# Patient Record
Sex: FEMALE | Race: WHITE | NOT HISPANIC OR LATINO | URBAN - METROPOLITAN AREA
[De-identification: names, ages, dates, MRNs, and addresses within clinical notes are randomized per-mention and may not be internally consistent; named-entity substitution may affect disease eponyms.]

---

## 2021-02-10 ENCOUNTER — INPATIENT (INPATIENT)
Facility: HOSPITAL | Age: 51
LOS: 2 days | Discharge: HOME | End: 2021-02-13
Attending: HOSPITALIST | Admitting: HOSPITALIST
Payer: COMMERCIAL

## 2021-02-10 VITALS
OXYGEN SATURATION: 100 % | SYSTOLIC BLOOD PRESSURE: 164 MMHG | HEART RATE: 113 BPM | HEIGHT: 62 IN | RESPIRATION RATE: 22 BRPM | WEIGHT: 171.08 LBS | TEMPERATURE: 97 F | DIASTOLIC BLOOD PRESSURE: 75 MMHG

## 2021-02-10 LAB
ALBUMIN SERPL ELPH-MCNC: 3.5 G/DL — SIGNIFICANT CHANGE UP (ref 3.5–5.2)
ALP SERPL-CCNC: 88 U/L — SIGNIFICANT CHANGE UP (ref 30–115)
ALT FLD-CCNC: 11 U/L — SIGNIFICANT CHANGE UP (ref 0–41)
ANION GAP SERPL CALC-SCNC: 11 MMOL/L — SIGNIFICANT CHANGE UP (ref 7–14)
ANISOCYTOSIS BLD QL: SIGNIFICANT CHANGE UP
APTT BLD: 32 SEC — SIGNIFICANT CHANGE UP (ref 27–39.2)
AST SERPL-CCNC: 19 U/L — SIGNIFICANT CHANGE UP (ref 0–41)
BILIRUB SERPL-MCNC: 1.8 MG/DL — HIGH (ref 0.2–1.2)
BLD GP AB SCN SERPL QL: SIGNIFICANT CHANGE UP
BUN SERPL-MCNC: 5 MG/DL — LOW (ref 10–20)
CALCIUM SERPL-MCNC: 8.5 MG/DL — SIGNIFICANT CHANGE UP (ref 8.5–10.1)
CHLORIDE SERPL-SCNC: 104 MMOL/L — SIGNIFICANT CHANGE UP (ref 98–110)
CO2 SERPL-SCNC: 21 MMOL/L — SIGNIFICANT CHANGE UP (ref 17–32)
CREAT SERPL-MCNC: 0.5 MG/DL — LOW (ref 0.7–1.5)
DACRYOCYTES BLD QL SMEAR: SLIGHT — SIGNIFICANT CHANGE UP
ELLIPTOCYTES BLD QL SMEAR: SIGNIFICANT CHANGE UP
GLUCOSE SERPL-MCNC: 144 MG/DL — HIGH (ref 70–99)
HCT VFR BLD CALC: 11.4 % — LOW (ref 37–47)
HGB BLD-MCNC: 2.7 G/DL — CRITICAL LOW (ref 12–16)
HYPOCHROMIA BLD QL: SIGNIFICANT CHANGE UP
INR BLD: 1.75 RATIO — HIGH (ref 0.65–1.3)
LDH SERPL L TO P-CCNC: 256 — HIGH (ref 50–242)
MACROCYTES BLD QL: SIGNIFICANT CHANGE UP
MANUAL SMEAR VERIFICATION: SIGNIFICANT CHANGE UP
MCHC RBC-ENTMCNC: 13.9 PG — LOW (ref 27–31)
MCHC RBC-ENTMCNC: 23.7 G/DL — LOW (ref 32–37)
MCV RBC AUTO: 58.8 FL — LOW (ref 81–99)
MICROCYTES BLD QL: SIGNIFICANT CHANGE UP
NEUTS HYPERSEG # BLD: SIGNIFICANT CHANGE UP
NRBC # BLD: 0 /100 — SIGNIFICANT CHANGE UP (ref 0–0)
NRBC # BLD: SIGNIFICANT CHANGE UP /100 WBCS (ref 0–0)
NT-PROBNP SERPL-SCNC: 1013 PG/ML — HIGH (ref 0–300)
OVALOCYTES BLD QL SMEAR: SLIGHT — SIGNIFICANT CHANGE UP
PLAT MORPH BLD: NORMAL — SIGNIFICANT CHANGE UP
PLATELET # BLD AUTO: 238 K/UL — SIGNIFICANT CHANGE UP (ref 130–400)
POIKILOCYTOSIS BLD QL AUTO: SIGNIFICANT CHANGE UP
POTASSIUM SERPL-MCNC: 3.9 MMOL/L — SIGNIFICANT CHANGE UP (ref 3.5–5)
POTASSIUM SERPL-SCNC: 3.9 MMOL/L — SIGNIFICANT CHANGE UP (ref 3.5–5)
PROT SERPL-MCNC: 6.3 G/DL — SIGNIFICANT CHANGE UP (ref 6–8)
PROTHROM AB SERPL-ACNC: 20.1 SEC — HIGH (ref 9.95–12.87)
RAPID RVP RESULT: SIGNIFICANT CHANGE UP
RBC # BLD: 1.94 M/UL — LOW (ref 4.2–5.4)
RBC # FLD: 23.7 % — HIGH (ref 11.5–14.5)
RBC BLD AUTO: ABNORMAL
SARS-COV-2 RNA SPEC QL NAA+PROBE: SIGNIFICANT CHANGE UP
SMUDGE CELLS # BLD: SIGNIFICANT CHANGE UP
SODIUM SERPL-SCNC: 136 MMOL/L — SIGNIFICANT CHANGE UP (ref 135–146)
TROPONIN T SERPL-MCNC: <0.01 NG/ML — SIGNIFICANT CHANGE UP
WBC # BLD: 5.8 K/UL — SIGNIFICANT CHANGE UP (ref 4.8–10.8)
WBC # FLD AUTO: 5.8 K/UL — SIGNIFICANT CHANGE UP (ref 4.8–10.8)

## 2021-02-10 PROCEDURE — 74177 CT ABD & PELVIS W/CONTRAST: CPT | Mod: 26

## 2021-02-10 PROCEDURE — 93970 EXTREMITY STUDY: CPT | Mod: 26

## 2021-02-10 PROCEDURE — 71045 X-RAY EXAM CHEST 1 VIEW: CPT | Mod: 26

## 2021-02-10 PROCEDURE — 71260 CT THORAX DX C+: CPT | Mod: 26

## 2021-02-10 PROCEDURE — 99285 EMERGENCY DEPT VISIT HI MDM: CPT

## 2021-02-10 PROCEDURE — 99223 1ST HOSP IP/OBS HIGH 75: CPT

## 2021-02-10 RX ORDER — FUROSEMIDE 40 MG
20 TABLET ORAL ONCE
Refills: 0 | Status: COMPLETED | OUTPATIENT
Start: 2021-02-10 | End: 2021-02-10

## 2021-02-10 RX ORDER — CEFTRIAXONE 500 MG/1
1000 INJECTION, POWDER, FOR SOLUTION INTRAMUSCULAR; INTRAVENOUS ONCE
Refills: 0 | Status: COMPLETED | OUTPATIENT
Start: 2021-02-10 | End: 2021-02-10

## 2021-02-10 RX ORDER — AZITHROMYCIN 500 MG/1
500 TABLET, FILM COATED ORAL ONCE
Refills: 0 | Status: COMPLETED | OUTPATIENT
Start: 2021-02-10 | End: 2021-02-10

## 2021-02-10 RX ADMIN — Medication 20 MILLIGRAM(S): at 17:31

## 2021-02-10 RX ADMIN — AZITHROMYCIN 255 MILLIGRAM(S): 500 TABLET, FILM COATED ORAL at 13:12

## 2021-02-10 RX ADMIN — Medication 20 MILLIGRAM(S): at 19:45

## 2021-02-10 RX ADMIN — CEFTRIAXONE 100 MILLIGRAM(S): 500 INJECTION, POWDER, FOR SOLUTION INTRAMUSCULAR; INTRAVENOUS at 13:28

## 2021-02-10 NOTE — ED ADULT TRIAGE NOTE - CHIEF COMPLAINT QUOTE
BIBA via [facility] from doctors office, pt states since today she has been experiencing shortness of breath and difficulty breathing associated with cough, Denies Chest pain

## 2021-02-10 NOTE — H&P ADULT - NSHPSOCIALHISTORY_GEN_ALL_CORE
Marital Status:  ( x  )    (   ) Single    (   )    (  )   Lives with: (  ) alone  (  ) children   ( x ) spouse   (  ) parents  (  ) other  Recent Travel: No recent travel  Occupation:  at [employer]    Substance Use (street drugs): ( x ) never used  (  ) other:  Tobacco Usage:  ( x  ) never smoked   (   ) former smoker   (   ) current smoker  (     ) pack year  Alcohol Usage: None

## 2021-02-10 NOTE — CONSULT NOTE ADULT - ASSESSMENT
IMPRESSION:  SOb secondary to symptomatic anemia    CHF   ?? underlying ovarian cancer or fibroid   underlying malignancy       PLAN:    CNS: no sedation     HEENT: oral care     PULMONARY:keep pox > 92%     CARDIOVASCULAR: s/p 20 mg lasix   give another 20 mg between tranfusion   echo   cardiology eval   ekg   trop negative   elevated BNP     GI: GI prophylaxis.  Feeding     RENAL: follow Is and OS follow lytes     INFECTIOUS DISEASE: no need for abx   check procalcitonin   pan cx     HEMATOLOGICAL:  DVT prophylaxis. transfuse 2-3 unit prbc   lasix in between   hemolytic work up , blood smear   hematology consult   OBGYn consult patient denies heavy menstrual cycle     ENDOCRINE:  Follow up FS.  Insulin protocol if needed.        CRITICAL CARE TIME SPENT: ***

## 2021-02-10 NOTE — H&P ADULT - NSHPLABSRESULTS_GEN_ALL_CORE
CT Chest w/ IV Cont (02.10.21)  1.  Findings compatible with pulmonary edema.  2.  Small to moderate bilateral pleural effusions with adjacent atelectasis.  3.  Cardiomegaly.  4.   Passive congestion of the liver.  5.  Heterogeneous and enlarged uterus, likely related to underlying degenerating fibroids.  6.  Left adnexal cyst measuring up to 3.2 cm. Pelvic ultrasound in 6-8 weeks is recommended for follow-up.      VA Duplex Lower Ext Vein Scan, Bilat (02.10.21):  No evidence of deep venous thrombosis or superficial thrombophlebitis in the bilateral lower extremities.

## 2021-02-10 NOTE — ED PROVIDER NOTE - PHYSICAL EXAMINATION
--EXAM--  VITAL SIGNS: I have reviewed vs documented at present.  CONSTITUTIONAL: Well-developed; well-nourished; in no acute distress.   SKIN: Warm and dry, no acute rash. positive pallor   HEAD: Normocephalic; atraumatic.  EYES: PERRL, EOM intact; conjunctiva and sclera clear. No nystagmus.  ENT: No nasal discharge; airway clear.  NECK: Supple; non tender.  CARD: S1, S2, Regular rate and rhythm.   RESP: positive diffuse ronchi   ABD: Normal bowel sounds; soft; non-distended; non-tender.  EXT: Normal ROM.   NEURO: Alert, oriented, grossly unremarkable. Strength 5/5 in all extremities. Sensation intact throughout.  PSYCH: Cooperative, appropriate.

## 2021-02-10 NOTE — ED PROVIDER NOTE - NS ED ROS FT
Review of Systems:  	•	CONSTITUTIONAL - no fever, no diaphoresis, no chills  	•	SKIN - no rash  	•	HEMATOLOGIC - no bleeding, no bruising  	•	EYES - no eye pain, no blurry vision  	•	ENT - no change in hearing, no sore throat, no ear pain or tinnitus  	•	RESPIRATORY -  shortness of breath, and  cough  	•	CARDIAC - no chest pain, no palpitations  	•	GI - no abd pain, no nausea, no vomiting, no diarrhea, no constipation  	•	GENITO-URINARY - no discharge, no dysuria; no hematuria, no increased urinary frequency  	•	MUSCULOSKELETAL - no joint paint, no swelling, no redness  	•	NEUROLOGIC - no weakness, no headache, no paresthesias, no LOC  	•	PSYCH - no anxiety, non suicidal, non homicidal, no hallucination, no depression

## 2021-02-10 NOTE — H&P ADULT - HISTORY OF PRESENT ILLNESS
50 yr old female with no pmhx, last seen by pmd 10 yrs ago presented to ER for sob for few hours. As per patient she has been suffering from mod b/l LE pain worse with standing or walking and better with rest, but her symptoms was improving. She went to pmd today for well visit. As she left pmd office, she got sob. So went back to office and pmd office called EMS. He sob subsided en-route to ER and resolved in ED. She denies any chest pain, palpitation abdominal pain no urinary or bowel issues. She lives home with , fully functional.

## 2021-02-10 NOTE — ED PROVIDER NOTE - OBJECTIVE STATEMENT
this is a 51 yo female presenting to ed for evaluation of shortness of breath. patient states she went to her doctor today because legs have been swollen and painful. patient states she had not been to her doctor for some time. patient states he sent her for labs and wanted her to go back for follow up next week. patient states that she was on her way home and felt short of breath.   she went back to office and they called her ambulance . patient does report one month of cough which got worse for two days. no chest pain.

## 2021-02-10 NOTE — ED PROVIDER NOTE - CLINICAL SUMMARY MEDICAL DECISION MAKING FREE TEXT BOX
50yF  poor outpt follow -  first saw OMD frist time  today  for  PAUL, leg swelling.  sent to ED   In ED Hgb 2.7,  BNP 1000  trop negative   CT with cardiomegaly,  hepatic congestion, pulm edema.  lasix 40 given.  transfusion started     dw icu - pt admitted to chest pain telemetry    (pt aware of  need for  gyn follow up for fibrois and adnexal cyst  - outpt US)

## 2021-02-10 NOTE — ED PROVIDER NOTE - ATTENDING CONTRIBUTION TO CARE
I was present for and supervised the key and critical aspects of the procedures performed during the care of the patient. Patient presents for evaluation I was present for and supervised the key and critical aspects of the procedures performed during the care of the patient. Patient presents for evaluation patient presents for evaluation of worsening sob she has no medical history but has not been evaluated for years, she noted worsening sob for the past several weeks to months but acutely worsening over the past 1 week with non-productive cough prompting outpatient visit, she denies any cp she denies any fevers or chills she denies any vomiting she denies any weakness   on physical exam patient is pale appearing, nc/at perrla eomi oropharynx clear but dry, patient has rhoncherous bs b/l, worse on the left than right, rrr s1s2 noted radial pulses 2 +=, abd-soft nt nd bs+ ext from with no focal deficits pedal pulses 2 += she has no focal deficits   A/P- patient presents for evaluation of sob, we obtained ekg, labs, and cxr initially given antibitoics based on complaints and exam however, found to be anemic given transfusion after h/h approx 2, at which point icu consulted advised to obtain cta chest/ab/pelvis I will admit for further management patient updated at this time agree with the plan of care  critical care 75 min

## 2021-02-10 NOTE — ED PROVIDER NOTE - PROGRESS NOTE DETAILS
results discussed with patient. patient is aware of anemia . will transfuse. patient signed consent spoke to dr woodward for unit approval . he wants a ct scan abdomen . call back with results of test. results of scans reviewed with  dr jones . kayla can be admitted to low risk tele.      spoke to hospitalist will admit

## 2021-02-10 NOTE — ED ADULT NURSE REASSESSMENT NOTE - NS ED NURSE REASSESS COMMENT FT1
As per MD Canseco second unit of PRBC to go over three hours, second dose of prescribed IVP furosemide given prior to start of second unit. Receiving RN aware

## 2021-02-10 NOTE — H&P ADULT - NSHPPHYSICALEXAM_GEN_ALL_CORE
T(C): 36.3 (02-10-21 @ 23:51), Max: 36.7 (02-10-21 @ 21:24)  HR: 99 (02-10-21 @ 23:51) (96 - 113)  BP: 138/77 (02-10-21 @ 23:51) (136/79 - 164/75)  RR: 20 (02-10-21 @ 23:51) (19 - 22)  SpO2: 100% (02-10-21 @ 23:51) (99% - 100%)        GENERAL: NAD, well-developed  HEAD:  Atraumatic, Normocephalic  EYES: EOMI, PERRLA, conjunctiva and sclera clear  ENT: Normal tympanic membrane. No nasal obstruction or discharge. No tonsillar exudate, swelling or erythema.  NECK: Supple, No JVD  CHEST/LUNG: Clear to auscultation bilaterally; No wheeze  HEART: Tachycardia and regular rhythm; No murmurs, rubs, or gallops  ABDOMEN: Soft, Nontender, Nondistended; Bowel sounds present  EXTREMITIES:  2+ Peripheral Pulses, No clubbing, cyanosis, or edema  PSYCH: AAOx3  NEUROLOGY: non-focal  SKIN: No rashes or lesions

## 2021-02-10 NOTE — ED ADULT NURSE NOTE - CHIEF COMPLAINT QUOTE
BIBA via [facility] from doctors office, pt states since today she has been experiencing shortness of breath and difficulty breathing associated with cough, Denies Chest pain

## 2021-02-10 NOTE — H&P ADULT - ASSESSMENT
50 yr old female with no pmhx, last seen by pmd 10 yrs ago presented to ER for sob for few hours.     # Chronic Microcytic Anemia   - Hb 2.7 --> pt received 3U pRBC in ER  - pt hemodynamically stable   - pt denies any symptoms of bleeding   - f/u Iron profile (requested from ER admission blood sample), retic count, LDH, peripheral smear  - fecal occult blood  - GI consult     # Cardiomegaly   - CT Chest w/ IV Cont (02.10.21): moderate bilateral pleural effusions with adjacent atelectasis. Cardiomegaly. Passive congestion of the liver.  - BNP 1000  - check ECHO  - start Lasix 20 mg daily     # DVT ppx  - SCD     # Ambulate as tolerated    # Full code 50 yr old female with no pmhx, last seen by pmd 10 yrs ago presented to ER for sob for few hours.     # Chronic Microcytic Anemia   - Hb 2.7 --> pt received 3U pRBC in ER  - pt hemodynamically stable   - pt denies any symptoms of bleeding   - NPO   - start protonix   - f/u Iron profile (requested from ER admission blood sample), retic count, LDH, peripheral smear  - fecal occult blood  - GI consult     # Cardiomegaly   - CT Chest w/ IV Cont (02.10.21): moderate bilateral pleural effusions with adjacent atelectasis. Cardiomegaly. Passive congestion of the liver.  - BNP 1000  - check ECHO  - start Lasix 20 mg daily     # DVT ppx  - SCD     # Ambulate as tolerated    # Full code

## 2021-02-11 LAB
ANION GAP SERPL CALC-SCNC: 13 MMOL/L — SIGNIFICANT CHANGE UP (ref 7–14)
APTT BLD: 30.8 SEC — SIGNIFICANT CHANGE UP (ref 27–39.2)
BASOPHILS # BLD AUTO: 0.03 K/UL — SIGNIFICANT CHANGE UP (ref 0–0.2)
BASOPHILS NFR BLD AUTO: 0.5 % — SIGNIFICANT CHANGE UP (ref 0–1)
BILIRUB DIRECT SERPL-MCNC: 0.5 MG/DL — HIGH (ref 0–0.2)
BUN SERPL-MCNC: 4 MG/DL — LOW (ref 10–20)
CALCIUM SERPL-MCNC: 8.8 MG/DL — SIGNIFICANT CHANGE UP (ref 8.5–10.1)
CHLORIDE SERPL-SCNC: 101 MMOL/L — SIGNIFICANT CHANGE UP (ref 98–110)
CO2 SERPL-SCNC: 28 MMOL/L — SIGNIFICANT CHANGE UP (ref 17–32)
CREAT SERPL-MCNC: 0.5 MG/DL — LOW (ref 0.7–1.5)
EOSINOPHIL # BLD AUTO: 0.06 K/UL — SIGNIFICANT CHANGE UP (ref 0–0.7)
EOSINOPHIL NFR BLD AUTO: 1 % — SIGNIFICANT CHANGE UP (ref 0–8)
GLUCOSE SERPL-MCNC: 91 MG/DL — SIGNIFICANT CHANGE UP (ref 70–99)
HCT VFR BLD CALC: 20.7 % — LOW (ref 37–47)
HCT VFR BLD CALC: 24.3 % — LOW (ref 37–47)
HGB BLD-MCNC: 6.1 G/DL — CRITICAL LOW (ref 12–16)
HGB BLD-MCNC: 7.5 G/DL — LOW (ref 12–16)
IMM GRANULOCYTES NFR BLD AUTO: 1.4 % — HIGH (ref 0.1–0.3)
INR BLD: 1.65 RATIO — HIGH (ref 0.65–1.3)
IRON SATN MFR SERPL: 19 UG/DL — LOW (ref 35–150)
IRON SATN MFR SERPL: 5 % — LOW (ref 15–50)
LDH SERPL L TO P-CCNC: 224 — SIGNIFICANT CHANGE UP (ref 50–242)
LYMPHOCYTES # BLD AUTO: 0.67 K/UL — LOW (ref 1.2–3.4)
LYMPHOCYTES # BLD AUTO: 11.4 % — LOW (ref 20.5–51.1)
MAGNESIUM SERPL-MCNC: 1.9 MG/DL — SIGNIFICANT CHANGE UP (ref 1.8–2.4)
MCHC RBC-ENTMCNC: 20.3 PG — LOW (ref 27–31)
MCHC RBC-ENTMCNC: 21.7 PG — LOW (ref 27–31)
MCHC RBC-ENTMCNC: 29.5 G/DL — LOW (ref 32–37)
MCHC RBC-ENTMCNC: 30.9 G/DL — LOW (ref 32–37)
MCV RBC AUTO: 68.8 FL — LOW (ref 81–99)
MCV RBC AUTO: 70.4 FL — LOW (ref 81–99)
MONOCYTES # BLD AUTO: 0.46 K/UL — SIGNIFICANT CHANGE UP (ref 0.1–0.6)
MONOCYTES NFR BLD AUTO: 7.8 % — SIGNIFICANT CHANGE UP (ref 1.7–9.3)
NEUTROPHILS # BLD AUTO: 4.6 K/UL — SIGNIFICANT CHANGE UP (ref 1.4–6.5)
NEUTROPHILS NFR BLD AUTO: 77.9 % — HIGH (ref 42.2–75.2)
NRBC # BLD: 0 /100 WBCS — SIGNIFICANT CHANGE UP (ref 0–0)
NRBC # BLD: 0 /100 WBCS — SIGNIFICANT CHANGE UP (ref 0–0)
PLATELET # BLD AUTO: 253 K/UL — SIGNIFICANT CHANGE UP (ref 130–400)
PLATELET # BLD AUTO: 302 K/UL — SIGNIFICANT CHANGE UP (ref 130–400)
POTASSIUM SERPL-MCNC: 3.2 MMOL/L — LOW (ref 3.5–5)
POTASSIUM SERPL-SCNC: 3.2 MMOL/L — LOW (ref 3.5–5)
PROTHROM AB SERPL-ACNC: 19 SEC — HIGH (ref 9.95–12.87)
RBC # BLD: 3.01 M/UL — LOW (ref 4.2–5.4)
RBC # BLD: 3.01 M/UL — LOW (ref 4.2–5.4)
RBC # BLD: 3.45 M/UL — LOW (ref 4.2–5.4)
RBC # FLD: 27.3 % — HIGH (ref 11.5–14.5)
RBC # FLD: 27.8 % — HIGH (ref 11.5–14.5)
RETICS #: 39.3 K/UL — SIGNIFICANT CHANGE UP (ref 25–125)
RETICS/RBC NFR: 1.3 % — SIGNIFICANT CHANGE UP (ref 0.5–1.5)
SARS-COV-2 IGG SERPL QL IA: POSITIVE
SARS-COV-2 IGM SERPL IA-ACNC: 1.79 INDEX — HIGH
SODIUM SERPL-SCNC: 142 MMOL/L — SIGNIFICANT CHANGE UP (ref 135–146)
TIBC SERPL-MCNC: 384 UG/DL — SIGNIFICANT CHANGE UP (ref 220–430)
TRANSFERRIN SERPL-MCNC: 319 MG/DL — SIGNIFICANT CHANGE UP (ref 200–360)
TSH SERPL-MCNC: 2.56 UIU/ML — SIGNIFICANT CHANGE UP (ref 0.27–4.2)
UIBC SERPL-MCNC: 365 UG/DL — SIGNIFICANT CHANGE UP (ref 110–370)
WBC # BLD: 5.9 K/UL — SIGNIFICANT CHANGE UP (ref 4.8–10.8)
WBC # BLD: 7.97 K/UL — SIGNIFICANT CHANGE UP (ref 4.8–10.8)
WBC # FLD AUTO: 5.9 K/UL — SIGNIFICANT CHANGE UP (ref 4.8–10.8)
WBC # FLD AUTO: 7.97 K/UL — SIGNIFICANT CHANGE UP (ref 4.8–10.8)

## 2021-02-11 PROCEDURE — 99233 SBSQ HOSP IP/OBS HIGH 50: CPT

## 2021-02-11 PROCEDURE — 99223 1ST HOSP IP/OBS HIGH 75: CPT

## 2021-02-11 RX ORDER — INFLUENZA VIRUS VACCINE 15; 15; 15; 15 UG/.5ML; UG/.5ML; UG/.5ML; UG/.5ML
0.5 SUSPENSION INTRAMUSCULAR ONCE
Refills: 0 | Status: COMPLETED | OUTPATIENT
Start: 2021-02-11 | End: 2021-02-11

## 2021-02-11 RX ORDER — FUROSEMIDE 40 MG
20 TABLET ORAL DAILY
Refills: 0 | Status: DISCONTINUED | OUTPATIENT
Start: 2021-02-11 | End: 2021-02-11

## 2021-02-11 RX ORDER — FUROSEMIDE 40 MG
40 TABLET ORAL ONCE
Refills: 0 | Status: COMPLETED | OUTPATIENT
Start: 2021-02-11 | End: 2021-02-11

## 2021-02-11 RX ORDER — PANTOPRAZOLE SODIUM 20 MG/1
40 TABLET, DELAYED RELEASE ORAL
Refills: 0 | Status: DISCONTINUED | OUTPATIENT
Start: 2021-02-11 | End: 2021-02-13

## 2021-02-11 RX ORDER — FUROSEMIDE 40 MG
40 TABLET ORAL
Refills: 0 | Status: DISCONTINUED | OUTPATIENT
Start: 2021-02-11 | End: 2021-02-13

## 2021-02-11 RX ORDER — POTASSIUM CHLORIDE 20 MEQ
40 PACKET (EA) ORAL
Refills: 0 | Status: COMPLETED | OUTPATIENT
Start: 2021-02-11 | End: 2021-02-12

## 2021-02-11 RX ADMIN — PANTOPRAZOLE SODIUM 40 MILLIGRAM(S): 20 TABLET, DELAYED RELEASE ORAL at 04:31

## 2021-02-11 RX ADMIN — Medication 40 MILLIGRAM(S): at 04:26

## 2021-02-11 RX ADMIN — Medication 40 MILLIGRAM(S): at 15:30

## 2021-02-11 RX ADMIN — Medication 40 MILLIEQUIVALENT(S): at 18:41

## 2021-02-11 NOTE — CONSULT NOTE ADULT - ASSESSMENT
50y[facility] patient has not seen a doctor in ten years. Patient admitted with acute on chronic anemia    Problem 1-severe acute on chronic anemia no gross GI bleeding  Rec  -PPI BID  -Clear liquids   -Maintain Hemodynamic Stability   -Monitor CBC  -CMP,Optimize Electrolytes  -PT,PTT,INR  -EKG, Chest-Xray   -Transfuse prn to hgb >8  -Two large bore IV lines  -Monitor Vital Signs  -Monitor Stool For blood, frequency, consistency, melena  -Hold Anticoagulation if benefits outweigh risks  -Active Type and Screen  -Iron Studies, Folate, Vitamin B12 levels     Problem 2-Small to moderate bilateral pleural effusions with adjacent atelectasis. Findings compatible with pulmonary edema  Rec  - Care as per primary team      Problem 3-Left adnexal cyst measuring up to 3.2 cm.   Rec  - Care as per primary team   -Pelvic ultrasound in 6-8 weeks is recommended for follow-up.      Problem 4-Heterogeneous and enlarged uterus, likely related to underlying degenerating fibroids  Rec  - Care as per primary team  50yFemale pmh patient has not seen a doctor in ten years. Patient admitted with acute on chronic anemia    Problem 1-Severe acute on chronic anemia no gross GI bleeding  ddx-avms,, PUD, r/o malignancy  Rec  - Patient will need  pulmonary risk stratification prior to GI workup. With pulm risk stratification we would like to know specifically if patient is at peak pulmonary optimization and if patient can be more optimized from a pulmonary standpoint for EGD/Colonoscopy   -Cardiac risk stratification prior to EGD/Colonoscopy   -PPI BID  -Clear liquids and diet as tolerated tomorrow  -Maintain Hemodynamic Stability   -Monitor CBC  -CMP,Optimize Electrolytes  -PT,PTT,INR  -EKG, Chest-Xray   -Transfuse prn to hgb >8  -Two large bore IV lines  -Monitor Vital Signs  -Monitor Stool For blood, frequency, consistency, melena  -Active Type and Screen  -Iron Studies, Folate, Vitamin B12 levels     Problem 2-Small to moderate bilateral pleural effusions with adjacent atelectasis. Findings compatible with pulmonary edema  Rec  - Care as per primary team      Problem 3-Left adnexal cyst measuring up to 3.2 cm.   Rec  - Care as per primary team   -Pelvic ultrasound in 6-8 weeks is recommended for follow-up.      Problem 4-Heterogeneous and enlarged uterus, likely related to underlying degenerating fibroids  Rec  - Care as per primary team

## 2021-02-11 NOTE — PROGRESS NOTE ADULT - ASSESSMENT
Acute hypoxemic respiratory failure  due to Acute CHF exacerbation  b/l pleural effusion  congestive hepatomegaly  chronic symptomatic microcytic anremia  LE pain  hypokalemia    Plan  maintain oxygen sat  currently on NC 3 lit  IV lasix 40 mg bid  BMP  K repelacmeent  Mg check  PPI  continue blood transfusion  repeat cbc post transfusion  GI on board  Endoscopy when optimized cardiac wise  LE doppler US -ve for DVT   DVT ppx SCD  ECho done awaiting report

## 2021-02-12 LAB
ANION GAP SERPL CALC-SCNC: 11 MMOL/L — SIGNIFICANT CHANGE UP (ref 7–14)
BUN SERPL-MCNC: <3 MG/DL — LOW (ref 10–20)
CALCIUM SERPL-MCNC: 9 MG/DL — SIGNIFICANT CHANGE UP (ref 8.5–10.1)
CHLORIDE SERPL-SCNC: 99 MMOL/L — SIGNIFICANT CHANGE UP (ref 98–110)
CO2 SERPL-SCNC: 32 MMOL/L — SIGNIFICANT CHANGE UP (ref 17–32)
CREAT SERPL-MCNC: 0.6 MG/DL — LOW (ref 0.7–1.5)
FERRITIN SERPL-MCNC: 9 NG/ML — LOW (ref 15–150)
GLUCOSE SERPL-MCNC: 96 MG/DL — SIGNIFICANT CHANGE UP (ref 70–99)
HCT VFR BLD CALC: 26.1 % — LOW (ref 37–47)
HGB BLD-MCNC: 7.9 G/DL — LOW (ref 12–16)
MCHC RBC-ENTMCNC: 21.5 PG — LOW (ref 27–31)
MCHC RBC-ENTMCNC: 30.3 G/DL — LOW (ref 32–37)
MCV RBC AUTO: 70.9 FL — LOW (ref 81–99)
NRBC # BLD: 0 /100 WBCS — SIGNIFICANT CHANGE UP (ref 0–0)
NT-PROBNP SERPL-SCNC: 1773 PG/ML — HIGH (ref 0–300)
PLATELET # BLD AUTO: 331 K/UL — SIGNIFICANT CHANGE UP (ref 130–400)
POTASSIUM SERPL-MCNC: 3.1 MMOL/L — LOW (ref 3.5–5)
POTASSIUM SERPL-SCNC: 3.1 MMOL/L — LOW (ref 3.5–5)
RBC # BLD: 3.68 M/UL — LOW (ref 4.2–5.4)
RBC # FLD: 27.5 % — HIGH (ref 11.5–14.5)
SODIUM SERPL-SCNC: 142 MMOL/L — SIGNIFICANT CHANGE UP (ref 135–146)
WBC # BLD: 6.55 K/UL — SIGNIFICANT CHANGE UP (ref 4.8–10.8)
WBC # FLD AUTO: 6.55 K/UL — SIGNIFICANT CHANGE UP (ref 4.8–10.8)

## 2021-02-12 PROCEDURE — 76830 TRANSVAGINAL US NON-OB: CPT | Mod: 26

## 2021-02-12 PROCEDURE — 99233 SBSQ HOSP IP/OBS HIGH 50: CPT

## 2021-02-12 RX ORDER — ZINC OXIDE 200 MG/G
1 OINTMENT TOPICAL
Refills: 0 | Status: DISCONTINUED | OUTPATIENT
Start: 2021-02-12 | End: 2021-02-13

## 2021-02-12 RX ORDER — POTASSIUM CHLORIDE 20 MEQ
40 PACKET (EA) ORAL ONCE
Refills: 0 | Status: COMPLETED | OUTPATIENT
Start: 2021-02-12 | End: 2021-02-12

## 2021-02-12 RX ADMIN — Medication 40 MILLIEQUIVALENT(S): at 05:15

## 2021-02-12 RX ADMIN — Medication 40 MILLIGRAM(S): at 05:16

## 2021-02-12 RX ADMIN — PANTOPRAZOLE SODIUM 40 MILLIGRAM(S): 20 TABLET, DELAYED RELEASE ORAL at 05:15

## 2021-02-12 RX ADMIN — Medication 40 MILLIEQUIVALENT(S): at 11:47

## 2021-02-12 RX ADMIN — Medication 40 MILLIGRAM(S): at 19:07

## 2021-02-12 RX ADMIN — Medication 40 MILLIEQUIVALENT(S): at 19:07

## 2021-02-12 NOTE — CONSULT NOTE ADULT - ASSESSMENT
Patient with no cardiac history. Normally no chest or sob. She is active. She presented as above. Hemoglobin was 2.7. She by ct scan had chf then. This was secondary anemia. Need check echo. Transfuse as needed. Correct lytes if needed. No overt chf now. Risk GI work up appears low. Watch labs. D/C lasix soon

## 2021-02-12 NOTE — PROGRESS NOTE ADULT - SUBJECTIVE AND OBJECTIVE BOX
50yFemale  Being followed for severe acute on chronic anemia   Interval history: Patient denies nausea, vomiting, hematemesis, melena, blood in stool, diarrhea, constipation, abdominal pain. Patient comfortable and ready for food with more substance.      PAST MEDICAL & SURGICAL HISTORY:   No pertinent past medical history    No significant past surgical history            Social History: No smoking. No alcohol. No illegal drug use.            MEDICATIONS  (STANDING):  furosemide   Injectable 40 milliGRAM(s) IV Push two times a day  pantoprazole    Tablet 40 milliGRAM(s) Oral before breakfast  potassium chloride    Tablet ER 40 milliEquivalent(s) Oral two times a day  potassium chloride    Tablet ER 40 milliEquivalent(s) Oral once  zinc oxide 20% Ointment 1 Application(s) Topical two times a day          Allergies:  No Known Allergies              REVIEW OF SYSTEMS:  General:  No weight loss, fevers, or chills.  Eyes:  No reported pain or visual changes  ENT:  No sore throat or runny nose.  NECK: No stiffness   CV:  No chest pain or palpitations.  Resp:  No shortness of breath, cough  GI:  No abdominal pain, nausea, vomiting, dysphagia, diarrhea or constipation. No rectal bleeding, melena, or hematemesis.  Neuro:  No tingling, numbness           VITAL SIGNS:   T(F): 97.8 (02-12-21 @ 05:26), Max: 98.1 (02-11-21 @ 21:03)  HR: 99 (02-12-21 @ 05:26) (94 - 99)  BP: 126/61 (02-12-21 @ 05:26) (123/60 - 141/65)  RR: 16 (02-12-21 @ 05:26) (16 - 16)  SpO2: 96% (02-12-21 @ 08:34) (96% - 96%)    PHYSICAL EXAM:  GENERAL: AAOx3, no acute distress.  HEAD:  Atraumatic, Normocephalic  EYES: conjunctiva and sclera clear  NECK: Supple, no JVD or thyromegaly  CHEST/LUNG: Clear to auscultation bilaterally; No wheeze, rhonchi, or rales  HEART: Regular rate and rhythm; normal S1, S2, No murmurs.  ABDOMEN: Soft, nontender, nondistended; Bowel sounds present,  NEUROLOGY: No asterixis or tremor.   SKIN: Intact, no jaundice            LABS:                        7.9    6.55  )-----------( 331      ( 12 Feb 2021 07:00 )             26.1     02-12    142  |  99  |  <3<L>  ----------------------------<  96  3.1<L>   |  32  |  0.6<L>    Ca    9.0      12 Feb 2021 07:00  Mg     1.9     02-11    TPro  x   /  Alb  x   /  TBili  x   /  DBili  0.5<H>  /  AST  x   /  ALT  x   /  AlkPhos  x   02-11      PT/INR - ( 11 Feb 2021 07:24 )   PT: 19.00 sec;   INR: 1.65 ratio         PTT - ( 11 Feb 2021 07:24 )  PTT:30.8 sec    IMAGING:      < from: CT Abdomen and Pelvis w/ IV Cont (02.10.21 @ 18:20) >    EXAM:  CT CHEST IC        EXAM:  CT ABDOMEN AND PELVIS IC            PROCEDURE DATE:  02/10/2021            INTERPRETATION:  CLINICAL INDICATION: anemia    TECHNIQUE:  CT of the thorax, abdomen and pelvis was performed following administration of intravenous contrast.      COMPARISON: None.    INTERPRETATION:    AIRWAYS, LUNGS AND PLEURA: The central tracheobronchial tree is patent.  Interlobular septal thickening and superimposed ground glass opacities, compatible pulmonary edema. There are small to moderate bilateral pleural effusions with adjacent atelectasis. There is no pneumothorax.    MEDIASTINUM: There are no enlarged mediastinal, hilar or axillary lymph nodes. The visualized portion of the thyroid gland is heterogeneous.    HEART AND VESSELS: There is cardiomegaly. The thoracic aorta has a normal caliber. Dilated main pulmonary artery, which may be seen with pulmonary arterial hypertension. There is no pericardial effusion.    LIVER: Heterogeneous enhancement of the liver, compatible with passive congestion.  BILIARY SYSTEM: There is no biliary ductal dilatation.  GALLBLADDER: No radiopaque gallstones.    PANCREAS: Within normal limits.  SPLEEN: Within normal limits.  ADRENALS: Within normal limits.    KIDNEYS/URETERS: No hydronephrosis or stones.    BOWEL/MESENTERY: No bowel obstruction or wall thickening.    URINARY BLADDER: Within normal limits.    REPRODUCTIVE ORGANS: Heterogeneous and enlarged uterus with areas of calcification, likely related to degenerating fibroids 3.2 cm left adnexal hypodensity, likely representing a cyst.    PERITONEUM/RETROPERITONEUM: Small amount of nonspecific pelvic free fluid. No free air or drainable fluid collection.  LYMPH NODES: There are multiple subcentimeter mesenteric lymph nodes, which are non-specific.  VESSELS: Within normal limits.    BONES: Within normal limits.  SOFT TISSUES: Anasarca is present.    TUBES/LINES: None.    IMPRESSION:    1.  Findings compatible with pulmonary edema.    2.  Small to moderate bilateral pleural effusions with adjacent atelectasis.    3.  Cardiomegaly.    4.   Passive congestion of the liver.    5.  Heterogeneous and enlarged uterus, likely related to underlying degenerating fibroids.    6.  Left adnexal cyst measuring up to 3.2 cm. Pelvic ultrasound in 6-8 weeks is recommended for follow-up.              CHEMA BETTS MD; Attending Radiologist  This document has been electronically signed. Feb 10 2021  7:27PM    < end of copied text >      
    Subjective  c/o LE pain       Constitutional: No fever, fatigue or weight loss.  Skin: No rash.  Eyes: No recent vision problems or eye pain.  ENT: No congestion, ear pain, or sore throat.  Endocrine: No thyroid problems.  Cardiovascular: No chest pain or palpation.  Respiratory:+ cough, shortness of breath,  Gastrointestinal: No abdominal pain, nausea, vomiting, or diarrhea.  Genitourinary: No dysuria.  Musculoskeletal: No joint swelling.  Neurologic: No headache.      Vital Signs Last 24 Hrs  T(C): 36.4 (02-11-21 @ 03:18), Max: 36.7 (02-10-21 @ 21:24)  T(F): 97.5 (02-11-21 @ 03:18), Max: 98.1 (02-10-21 @ 21:24)  HR: 95 (02-11-21 @ 03:18) (95 - 110)  BP: 128/61 (02-11-21 @ 03:18) (128/61 - 148/72)  BP(mean): --  RR: 18 (02-11-21 @ 03:18) (16 - 22)  SpO2: 100% (02-10-21 @ 23:51) (99% - 100%)        PHYSICAL EXAM-  GENERAL: NAD, well-groomed, well-developed  HEAD:  Atraumatic, Normocephalic  EYES: EOMI, PERRLA, conjunctiva and sclera icteric  NECK: Supple,+JVD  NERVOUS SYSTEM:  Alert & Oriented X3, Motor Strength 5/5 B/L upper and lower extremities; DTRs 2+ intact and symmetric  CHEST/LUNG: Dec iar entry at lower zones b/l  HEART: tachcyardia regualr, accentuated sounds, displaced PMI  ABDOMEN: Soft, Nontender, Nondistended; Bowel sounds present  EXTREMITIES:  2+ Peripheral Pulses, No clubbing, cyanosis,.non pitting edema LE  SKIN: hyperakeratosis of LEs                                  6.1    5.90  )-----------( 253      ( 11 Feb 2021 07:24 )             20.7     02-11    142  |  101  |  4<L>  ----------------------------<  91  3.2<L>   |  28  |  0.5<L>    Ca    8.8      11 Feb 2021 07:24  Mg     1.9     02-11    TPro  6.3  /  Alb  3.5  /  TBili  1.8<H>  /  DBili  x   /  AST  19  /  ALT  11  /  AlkPhos  88  02-10    CARDIAC MARKERS ( 10 Feb 2021 11:23 )  x     / <0.01 ng/mL / x     / x     / x              PT/INR - ( 11 Feb 2021 07:24 )   PT: 19.00 sec;   INR: 1.65 ratio         PTT - ( 11 Feb 2021 07:24 )  PTT:30.8 sec        MEDICATIONS  (STANDING):  furosemide   Injectable 40 milliGRAM(s) IV Push two times a day  pantoprazole    Tablet 40 milliGRAM(s) Oral before breakfast  potassium chloride    Tablet ER 40 milliEquivalent(s) Oral two times a day    MEDICATIONS  (PRN):          RADIOLOGY RESULTS:  
    Subjective  c/o LE rash      Deneis fever,sob,chest pain        PHYSICAL EXAM-  GENERAL: NAD, well-groomed, well-developed  HEAD:  Atraumatic, Normocephalic  EYES: EOMI, PERRLA, conjunctiva and sclera icteric  NECK: Supple,+JVD  NERVOUS SYSTEM:  Alert & Oriented X3, Motor Strength 5/5 B/L upper and lower extremities; DTRs 2+ intact and symmetric  CHEST/LUNG: Dec iar entry at lower zones b/l  HEART: tachcyardia regualr, accentuated sounds, displaced PMI  ABDOMEN: Soft, Nontender, Nondistended; Bowel sounds present  EXTREMITIES:  2+ Peripheral Pulses, No clubbing, cyanosis,.non pitting edema LE  SKIN: hyperakeratosis of LEs                                  6.1    5.90  )-----------( 253      ( 11 Feb 2021 07:24 )             20.7     02-11    142  |  101  |  4<L>  ----------------------------<  91  3.2<L>   |  28  |  0.5<L>    Ca    8.8      11 Feb 2021 07:24  Mg     1.9     02-11    TPro  6.3  /  Alb  3.5  /  TBili  1.8<H>  /  DBili  x   /  AST  19  /  ALT  11  /  AlkPhos  88  02-10    CARDIAC MARKERS ( 10 Feb 2021 11:23 )  x     / <0.01 ng/mL / x     / x     / x              PT/INR - ( 11 Feb 2021 07:24 )   PT: 19.00 sec;   INR: 1.65 ratio         PTT - ( 11 Feb 2021 07:24 )  PTT:30.8 sec        MEDICATIONS  (STANDING):  furosemide   Injectable 40 milliGRAM(s) IV Push two times a day  pantoprazole    Tablet 40 milliGRAM(s) Oral before breakfast  potassium chloride    Tablet ER 40 milliEquivalent(s) Oral two times a day    MEDICATIONS  (PRN):          RADIOLOGY RESULTS:  CT b/ pleural effusions  congestiv ehep;atomegaly
Patient is a 50y old  Female who presents with a chief complaint of sob (11 Feb 2021 12:53)      Over Night Events:  Patient seen and examined.   lying in bed comfortable feel much better cough improved     ROS:  See HPI    PHYSICAL EXAM    ICU Vital Signs Last 24 Hrs  T(C): 36.6 (12 Feb 2021 05:26), Max: 36.7 (11 Feb 2021 21:03)  T(F): 97.8 (12 Feb 2021 05:26), Max: 98.1 (11 Feb 2021 21:03)  HR: 99 (12 Feb 2021 05:26) (94 - 99)  BP: 126/61 (12 Feb 2021 05:26) (123/60 - 141/65)  BP(mean): --  ABP: --  ABP(mean): --  RR: 16 (12 Feb 2021 05:26) (16 - 16)  SpO2: --      General: AOx3  HEENT:    mook            Lymph Nodes: NO cervical LN   Lungs: Bilateral BS  Cardiovascular: Regular   Abdomen: Soft, Positive BS  Extremities: No clubbing   Skin: warm   Neurological: no focal   Musculoskeletal: move all ext     I&O's Detail      LABS:                          7.9    6.55  )-----------( 331      ( 12 Feb 2021 07:00 )             26.1         11 Feb 2021 07:24    142    |  101    |  4      ----------------------------<  91     3.2     |  28     |  0.5      Ca    8.8        11 Feb 2021 07:24  Mg     1.9       11 Feb 2021 07:24    TPro  x      /  Alb  x      /  TBili  x      /  DBili  0.5    /  AST  x      /  ALT  x      /  AlkPhos  x      11 Feb 2021 15:00  Amylase x     lipase x                                                 PT/INR - ( 11 Feb 2021 07:24 )   PT: 19.00 sec;   INR: 1.65 ratio         PTT - ( 11 Feb 2021 07:24 )  PTT:30.8 sec                                             CARDIAC MARKERS ( 10 Feb 2021 11:23 )  x     / <0.01 ng/mL / x     / x     / x                                                                                                                                                 MEDICATIONS  (STANDING):  furosemide   Injectable 40 milliGRAM(s) IV Push two times a day  pantoprazole    Tablet 40 milliGRAM(s) Oral before breakfast  potassium chloride    Tablet ER 40 milliEquivalent(s) Oral two times a day    MEDICATIONS  (PRN):          Xrays:  TLC:  OG:  ET tube:                                                                                       ECHO:  CAM ICU:

## 2021-02-12 NOTE — CONSULT NOTE ADULT - SUBJECTIVE AND OBJECTIVE BOX
Patient is a 50y old  Female who presents with a chief complaint of     HPI:amaury is a 51 yo female presenting to ed for evaluation of shortness of breath. patient states she went to her doctor today because legs have been swollen and painful. patient states she had not been to her doctor for some time. patient states he sent her for labs and wanted her to go back for follow up next week. patient states that she was on her way home and felt short of breath.   she went back to office and they called her ambulance . patient does report one month of cough which got worse for two days. no chest pain  cxr ct scan b/l pleural effusion CHF also HB 2.7 given 1 unit of prbc and lasix now feel better     PAST MEDICAL & SURGICAL HISTORY:  No pertinent past medical history      Allergies    No Known Allergies    Intolerances      Family history : no cardiovscular family history   Home Medications:    Occupation:  Alochol: Denied  Smoking: Denied  Drug Use: Denied  Marital Status:         ROS: as in HPI; All other systems reviewed are negative    ICU Vital Signs Last 24 Hrs  T(C): 36.2 (10 Feb 2021 18:30), Max: 36.5 (10 Feb 2021 17:10)  T(F): 97.1 (10 Feb 2021 18:30), Max: 97.7 (10 Feb 2021 17:10)  HR: 110 (10 Feb 2021 18:30) (102 - 113)  BP: 136/79 (10 Feb 2021 18:30) (136/79 - 164/75)  BP(mean): --  ABP: --  ABP(mean): --  RR: 20 (10 Feb 2021 18:30) (19 - 22)  SpO2: 99% (10 Feb 2021 18:30) (99% - 100%)        Physical Examination:    General: No acute distress.  Alert, oriented, interactive, nonfocal    HEENT: Pupils equal, reactive to light.  Symmetric.    PULM: b/l crackles     CVS: Regular rate and rhythm, no murmurs, rubs, or gallops    ABD: Soft, nondistended, nontender, normoactive bowel sounds, no masses    EXT: 2 edema, nontender, no clubbing     SKIN: Warm and well perfused, no rashes noted.    Neurology : no motor or sensory deficit     Musculoskeletal : move all extremity     Lymphatic system: no Palpable node               I&O's Detail    10 Feb 2021 07:01  -  10 Feb 2021 19:51  --------------------------------------------------------  IN:  Total IN: 0 mL    OUT:    Voided (mL): 1900 mL  Total OUT: 1900 mL    Total NET: -1900 mL            LABS:                        2.7    5.80  )-----------( 238      ( 10 Feb 2021 15:22 )             11.4     10 Feb 2021 11:23    136    |  104    |  5      ----------------------------<  144    3.9     |  21     |  0.5      Ca    8.5        10 Feb 2021 11:23    TPro  6.3    /  Alb  3.5    /  TBili  1.8    /  DBili  x      /  AST  19     /  ALT  11     /  AlkPhos  88     10 Feb 2021 11:23  Amylase x     lipase x          CARDIAC MARKERS ( 10 Feb 2021 11:23 )  x     / <0.01 ng/mL / x     / x     / x          CAPILLARY BLOOD GLUCOSE        PT/INR - ( 10 Feb 2021 11:23 )   PT: 20.10 sec;   INR: 1.75 ratio         PTT - ( 10 Feb 2021 11:23 )  PTT:32.0 sec    Culture        MEDICATIONS  (STANDING):    MEDICATIONS  (PRN):        RADIOLOGY: ***   < from: CT Chest w/ IV Cont (02.10.21 @ 18:20) >    Findings compatible with pulmonary edema.    2.  Small to moderate bilateral pleural effusions with adjacent atelectasis.    3.  Cardiomegaly.    4.   Passive congestion of the liver.    5.  Heterogeneous and enlarged uterus, likely related to underlying degenerating fibroids.    6.  Left adnexal cyst measuring up to 3.2 cm. Pelvic ultrasound in 6-8 weeks is recommended for follow-up.    < end of copied text >    CXR: b/l effusion  TLC:  OG:  ET tube:        CAM ICU:  ECHO:        
Chief complaint/Reason for consult: acute on chronic anemia     HPI:  50 yr old female with no pmhx, last seen by pmd 10 yrs ago presented to ER for sob for few hours. As per patient she has been suffering from mod b/l LE pain worse with standing or walking and better with rest, but her symptoms was improving. She went to pmd today for well visit. As she left pmd office, she got sob. So went back to office and pmd office called EMS. He sob subsided en-route to ER and resolved in ED. She denies any chest pain, palpitation abdominal pain no urinary or bowel issues. She lives home with , fully functional.  (10 Feb 2021 22:04)    GI updates: 50[facility] has not seen a doctor in ten years. Patient denies nausea, vomiting, hematemesis, melena, blood in stool, diarrhea, constipation, abdominal pain. Patient with weakness on admission no gross GI bleeding reported.      PAST MEDICAL & SURGICAL HISTORY:   No pertinent past medical history    No significant past surgical history          Family history:  FAMILY HISTORY:  Family history of bone cancer      No GI cancers in first or second degree relatives    Social History: No smoking. No alcohol. No illegal drug use.    Allergies:   No Known Allergies      MEDICATIONS  (STANDING):  furosemide    Tablet 20 milliGRAM(s) Oral daily  pantoprazole    Tablet 40 milliGRAM(s) Oral before breakfast          REVIEW OF SYSTEMS  General:  No weight loss, fevers, or chills.  Eyes:  No reported pain or visual changes  ENT:  No sore throat or runny nose.  NECK: No stiffness or lymphadenopathy  CV:  No chest pain or palpitations.  Resp:  No shortness of breath, cough, wheezing or hemoptysis  GI:  No abdominal pain, nausea, vomiting, dysphagia, diarrhea or constipation. No rectal bleeding, melena, or hematemesis.  Muscle:  No aches or weakness  Neuro:  No tingling, numbness       VITALS:   T(F): 97.5 (02-11-21 @ 03:18), Max: 98.1 (02-10-21 @ 21:24)  HR: 95 (02-11-21 @ 03:18) (95 - 110)  BP: 128/61 (02-11-21 @ 03:18) (128/61 - 148/72)  RR: 18 (02-11-21 @ 03:18) (16 - 22)  SpO2: 100% (02-10-21 @ 23:51) (99% - 100%)    PHYSICAL EXAM:  GENERAL: AAOx3, no acute distress.  HEAD:  Atraumatic, Normocephalic  EYES: conjunctiva and sclera clear  NECK: Supple, No thyromegaly   CHEST/LUNG: Clear to auscultation bilaterally; No wheeze, rhonchi, or rales  HEART: Regular rate and rhythm; normal S1, S2, No murmurs.  ABDOMEN: Soft, nontender, nondistended; Bowel sounds present   NEUROLOGY: No asterixis or tremor  SKIN: Intact, no jaundice  Rectal exam-brown stool in rectal vault and on finger         LABS:  02-11    142  |  101  |  4<L>  ----------------------------<  91  3.2<L>   |  28  |  0.5<L>    Ca    8.8      11 Feb 2021 07:24  Mg     1.9     02-11    TPro  6.3  /  Alb  3.5  /  TBili  1.8<H>  /  DBili  x   /  AST  19  /  ALT  11  /  AlkPhos  88  02-10                          6.1    5.90  )-----------( 253      ( 11 Feb 2021 07:24 )             20.7     LIVER FUNCTIONS - ( 10 Feb 2021 11:23 )  Alb: 3.5 g/dL / Pro: 6.3 g/dL / ALK PHOS: 88 U/L / ALT: 11 U/L / AST: 19 U/L / GGT: x           PT/INR - ( 11 Feb 2021 07:24 )   PT: 19.00 sec;   INR: 1.65 ratio         PTT - ( 11 Feb 2021 07:24 )  PTT:30.8 sec    IMAGING:    < from: CT Abdomen and Pelvis w/ IV Cont (02.10.21 @ 18:20) >    EXAM:  CT CHEST IC        EXAM:  CT ABDOMEN AND PELVIS IC            PROCEDURE DATE:  02/10/2021            INTERPRETATION:  CLINICAL INDICATION: anemia    TECHNIQUE:  CT of the thorax, abdomen and pelvis was performed following administration of intravenous contrast.      COMPARISON: None.    INTERPRETATION:    AIRWAYS, LUNGS AND PLEURA: The central tracheobronchial tree is patent.  Interlobular septal thickening and superimposed ground glass opacities, compatible pulmonary edema. There are small to moderate bilateral pleural effusions with adjacent atelectasis. There is no pneumothorax.    MEDIASTINUM: There are no enlarged mediastinal, hilar or axillary lymph nodes. The visualized portion of the thyroid gland is heterogeneous.    HEART AND VESSELS: There is cardiomegaly. The thoracic aorta has a normal caliber. Dilated main pulmonary artery, which may be seen with pulmonary arterial hypertension. There is no pericardial effusion.    LIVER: Heterogeneous enhancement of the liver, compatible with passive congestion.  BILIARY SYSTEM: There is no biliary ductal dilatation.  GALLBLADDER: No radiopaque gallstones.    PANCREAS: Within normal limits.  SPLEEN: Within normal limits.  ADRENALS: Within normal limits.    KIDNEYS/URETERS: No hydronephrosis or stones.    BOWEL/MESENTERY: No bowel obstruction or wall thickening.    URINARY BLADDER: Within normal limits.    REPRODUCTIVE ORGANS: Heterogeneous and enlarged uterus with areas of calcification, likely related to degenerating fibroids 3.2 cm left adnexal hypodensity, likely representing a cyst.    PERITONEUM/RETROPERITONEUM: Small amount of nonspecific pelvic free fluid. No free air or drainable fluid collection.  LYMPH NODES: There are multiple subcentimeter mesenteric lymph nodes, which are non-specific.  VESSELS: Within normal limits.    BONES: Within normal limits.  SOFT TISSUES: Anasarca is present.    TUBES/LINES: None.    IMPRESSION:    1.  Findings compatible with pulmonary edema.    2.  Small to moderate bilateral pleural effusions with adjacent atelectasis.    3.  Cardiomegaly.    4.   Passive congestion of the liver.    5.  Heterogeneous and enlarged uterus, likely related to underlying degenerating fibroids.    6.  Left adnexal cyst measuring up to 3.2 cm. Pelvic ultrasound in 6-8 weeks is recommended for follow-up.              CHEMA BETTS MD; Attending Radiologist  This document has been electronically signed. Feb 10 2021  7:27PM    < end of copied text >      
CARDIOLOGY CONSULT NOTE     CHIEF COMPLAINT/REASON FOR CONSULT:    HPI:  50 yr old female with no pmhx, last seen by pmd 10 yrs ago presented to ER for sob for few hours. As per patient she has been suffering from mod b/l LE pain worse with standing or walking and better with rest, but her symptoms was improving. She went to pmd today for well visit. As she left pmd office, she got sob. So went back to office and pmd office called EMS. He sob subsided en-route to ER and resolved in ED. She denies any chest pain, palpitation abdominal pain no urinary or bowel issues. She lives home with , fully functional.  (10 Feb 2021 22:04)      PAST MEDICAL & SURGICAL HISTORY:  No pertinent past medical history    No significant past surgical history        Cardiac Risks:   [ ]HTN, [ ] DM, [ ] Smoking, [ ] FH,  [ ] Lipids        MEDICATIONS:  MEDICATIONS  (STANDING):  furosemide   Injectable 40 milliGRAM(s) IV Push two times a day  pantoprazole    Tablet 40 milliGRAM(s) Oral before breakfast  potassium chloride    Tablet ER 40 milliEquivalent(s) Oral two times a day      FAMILY HISTORY:  Family history of bone cancer        SOCIAL HISTORY:      [ ] Marital status   Allergies    No Known Allergies      	    REVIEW OF SYSTEMS:  CONSTITUTIONAL: No fever, weight loss, or fatigue  EYES: No eye pain, visual disturbances, or discharge  ENMT:  No difficulty hearing, tinnitus, vertigo; No sinus or throat pain  NECK: No pain or stiffness  RESPIRATORY: No cough, wheezing, chills or hemoptysis; No Shortness of Breath  CARDIOVASCULAR: No chest pain, palpitations, passing out, dizziness, or leg swelling  GASTROINTESTINAL: No abdominal or epigastric pain. No nausea, vomiting, or hematemesis; No diarrhea or constipation. No melena or hematochezia.  GENITOURINARY: No dysuria, frequency, hematuria, or incontinence  NEUROLOGICAL: No headaches, memory loss, loss of strength, numbness, or tremors  SKIN: No itching, burning, rashes, or lesions   	        PHYSICAL EXAM:  T(C): 36.6 (02-12-21 @ 05:26), Max: 36.7 (02-11-21 @ 21:03)  HR: 99 (02-12-21 @ 05:26) (94 - 99)  BP: 126/61 (02-12-21 @ 05:26) (123/60 - 141/65)  RR: 16 (02-12-21 @ 05:26) (16 - 16)  SpO2: 96% (02-12-21 @ 08:34) (96% - 96%)  Wt(kg): --  I&O's Summary      Appearance: Normal	  Psychiatry: A & O x 3, Mood & affect appropriate  HEENT:   Normal oral mucosa, PERRL, EOMI	  Lymphatic: No lymphadenopathy  Cardiovascular: Normal S1 S2,RRR, No JVD, II/vi tali lsb  Respiratory: Lungs clear to auscultation	  Gastrointestinal:  Soft, Non-tender, + BS	  Skin: No rashes, No ecchymoses, No cyanosis	  Neurologic: Non-focal  Extremities: Normal range of motion, No clubbing, cyanosis or edema  Vascular: Peripheral pulses palpable 2+ bilaterally      ECG:  	< from: 12 Lead ECG (02.10.21 @ 11:20) >    Diagnosis Line   Sinus tachycardia  Rightward axis  Nonspecific ST-T changes  Confirmed by SOUMYA BOSCH MD ([id number]) on 2/10/2021 11:56:00 AM    < end of copied text >      	  LABS:	 	    CARDIAC MARKERS:          Serum Pro-Brain Natriuretic Peptide: 1773 pg/mL (02-12 @ 07:00)                            7.9    6.55  )-----------( 331      ( 12 Feb 2021 07:00 )             26.1     02-12    142  |  99  |  <3<L>  ----------------------------<  96  3.1<L>   |  32  |  0.6<L>    Ca    9.0      12 Feb 2021 07:00  Mg     1.9     02-11    TPro  x   /  Alb  x   /  TBili  x   /  DBili  0.5<H>  /  AST  x   /  ALT  x   /  AlkPhos  x   02-11    PT/INR - ( 11 Feb 2021 07:24 )   PT: 19.00 sec;   INR: 1.65 ratio         PTT - ( 11 Feb 2021 07:24 )  PTT:30.8 sec  proBNP: Serum Pro-Brain Natriuretic Peptide: 1773 pg/mL (02-12 @ 07:00)

## 2021-02-12 NOTE — PROGRESS NOTE ADULT - ASSESSMENT
IMPRESSION:  SOb secondary to symptomatic anemia    CHF   ?? underlying ovarian cancer or fibroid   underlying malignancy       PLAN:    CNS: no sedation     HEENT: oral care     PULMONARY:keep pox > 92% doubt any lung pathology the effusion most likely from failure   repeat cxr to follow if not improved on lasix will consider pleural tap    continue lasix monitor is and os , lytes   cardiology consult    cardiology eval   follow GI   need Obgyn eval ?? cause of anemia from uterine bleed   from pulmonary point patient on RA now if cxr improve effusion she has mild risk for EGD or colonoscopy   need cardiology eval and echo doubt pulmonary disorder effusion most likely cardiac   need outpatient follow up

## 2021-02-12 NOTE — PROGRESS NOTE ADULT - ASSESSMENT
Acute hypoxemic respiratory failure-----reslved  due to Acute CHF exacerbation likely high output heart failure  b/l pleural effusion  congestive hepatomegaly  chronic symptomatic microcytic anremia  LE pain  hypokalemia    Plan  maintain oxygen sat  currently off NC  IV lasix 40 mg bid  BMP daily  K repelacmeent; extra K cl 40 meq ordered  Mg WNL  PPI  GI on board  Endoscopy a outpatient  LE doppler US -ve for DVT   DVT ppx SCD  ECho done WNL as per cardiac center (phone call)  US transvaginal to check tubo-ovarain pathology and fibroid?  SCD for DVT ppx  repeat CXR to reassess pleural effusions

## 2021-02-12 NOTE — PROGRESS NOTE ADULT - ASSESSMENT
50y[facility] patient has not seen a doctor in ten years. Patient admitted with acute on chronic anemia    Problem 1-Severe acute on chronic anemia no gross GI bleeding  ddx-avms, PUD, r/o malignancy  Rec  -have patient on clear liquids Monday and likely EGD/Colon Tuesday  - Patient will need  pulmonary risk stratification prior to GI workup. With pulm risk stratification we would like to know specifically if patient is at peak pulmonary optimization and if patient can be more optimized from a pulmonary standpoint for EGD/Colonoscopy, pulm risk appreciated if C-XR improves risk appears to be mild   -Cardiac risk stratification prior to EGD/Colonoscopy appreciated patient low risk   -PPI BID  -Diet as tolerated   -Maintain Hemodynamic Stability   -Monitor CBC  -CMP,Optimize Electrolytes  -PT,PTT,INR  -EKG, Chest-Xray   -Transfuse prn to hgb >8  -Two large bore IV lines  -Monitor Vital Signs  -Monitor Stool For blood, frequency, consistency, melena  -Active Type and Screen  -Iron Studies, Folate, Vitamin B12 levels     Problem 2-Small to moderate bilateral pleural effusions with adjacent atelectasis. Findings compatible with pulmonary edema  Rec  - Care as per primary team      Problem 3-Left adnexal cyst measuring up to 3.2 cm.   Rec  - Care as per primary team   -Pelvic ultrasound in 6-8 weeks is recommended for follow-up.      Problem 4-Heterogeneous and enlarged uterus, likely related to underlying degenerating fibroids  Rec  - Care as per primary team    50y[facility] patient has not seen a doctor in ten years. Patient admitted with acute on chronic anemia    Problem 1-Severe acute on chronic anemia no gross GI bleeding  ddx-avms, PUD, r/o malignancy  Rec  -have patient on clear liquids Monday and likely EGD/Colon Tuesday  - Patient will need  pulmonary risk stratification prior to GI workup. With pulm risk stratification we would like to know specifically if patient is at peak pulmonary optimization and if patient can be more optimized from a pulmonary standpoint for EGD/Colonoscopy, pulm risk appreciated if C-XR improves risk appears to be mild   -Cardiac risk stratification prior to EGD/Colonoscopy appreciated patient low risk   -PPI BID  -Diet as tolerated   -Maintain Hemodynamic Stability   -Monitor CBC  -CMP,Optimize Electrolytes  -PT,PTT,INR  -Transfuse prn to hgb >8  -Two large bore IV lines  -Monitor Vital Signs  -Monitor Stool For blood, frequency, consistency, melena  -Active Type and Screen      Problem 2-Small to moderate bilateral pleural effusions with adjacent atelectasis. Findings compatible with pulmonary edema  Rec  - Care as per primary team      Problem 3-Left adnexal cyst measuring up to 3.2 cm.   Rec  - Care as per primary team   -Pelvic ultrasound in 6-8 weeks is recommended for follow-up.      Problem 4-Heterogeneous and enlarged uterus, likely related to underlying degenerating fibroids  Rec  - Care as per primary team

## 2021-02-13 VITALS
DIASTOLIC BLOOD PRESSURE: 58 MMHG | HEART RATE: 97 BPM | SYSTOLIC BLOOD PRESSURE: 116 MMHG | TEMPERATURE: 97 F | RESPIRATION RATE: 16 BRPM

## 2021-02-13 LAB
ANION GAP SERPL CALC-SCNC: 10 MMOL/L — SIGNIFICANT CHANGE UP (ref 7–14)
BUN SERPL-MCNC: 5 MG/DL — LOW (ref 10–20)
CALCIUM SERPL-MCNC: 8.9 MG/DL — SIGNIFICANT CHANGE UP (ref 8.5–10.1)
CHLORIDE SERPL-SCNC: 102 MMOL/L — SIGNIFICANT CHANGE UP (ref 98–110)
CO2 SERPL-SCNC: 31 MMOL/L — SIGNIFICANT CHANGE UP (ref 17–32)
CREAT SERPL-MCNC: 0.6 MG/DL — LOW (ref 0.7–1.5)
GLUCOSE SERPL-MCNC: 99 MG/DL — SIGNIFICANT CHANGE UP (ref 70–99)
HCT VFR BLD CALC: 26.9 % — LOW (ref 37–47)
HGB BLD-MCNC: 8 G/DL — LOW (ref 12–16)
MCHC RBC-ENTMCNC: 21.6 PG — LOW (ref 27–31)
MCHC RBC-ENTMCNC: 29.7 G/DL — LOW (ref 32–37)
MCV RBC AUTO: 72.5 FL — LOW (ref 81–99)
NRBC # BLD: 0 /100 WBCS — SIGNIFICANT CHANGE UP (ref 0–0)
PLATELET # BLD AUTO: 383 K/UL — SIGNIFICANT CHANGE UP (ref 130–400)
POTASSIUM SERPL-MCNC: 3.9 MMOL/L — SIGNIFICANT CHANGE UP (ref 3.5–5)
POTASSIUM SERPL-SCNC: 3.9 MMOL/L — SIGNIFICANT CHANGE UP (ref 3.5–5)
RBC # BLD: 3.71 M/UL — LOW (ref 4.2–5.4)
RBC # FLD: 28.4 % — HIGH (ref 11.5–14.5)
SODIUM SERPL-SCNC: 143 MMOL/L — SIGNIFICANT CHANGE UP (ref 135–146)
WBC # BLD: 5.57 K/UL — SIGNIFICANT CHANGE UP (ref 4.8–10.8)
WBC # FLD AUTO: 5.57 K/UL — SIGNIFICANT CHANGE UP (ref 4.8–10.8)

## 2021-02-13 PROCEDURE — 99238 HOSP IP/OBS DSCHRG MGMT 30/<: CPT

## 2021-02-13 PROCEDURE — 71046 X-RAY EXAM CHEST 2 VIEWS: CPT | Mod: 26

## 2021-02-13 RX ORDER — PANTOPRAZOLE SODIUM 20 MG/1
1 TABLET, DELAYED RELEASE ORAL
Qty: 30 | Refills: 0
Start: 2021-02-13 | End: 2021-03-14

## 2021-02-13 RX ORDER — FUROSEMIDE 40 MG
1 TABLET ORAL
Qty: 7 | Refills: 0
Start: 2021-02-13 | End: 2021-02-19

## 2021-02-13 RX ADMIN — Medication 40 MILLIGRAM(S): at 04:56

## 2021-02-13 RX ADMIN — PANTOPRAZOLE SODIUM 40 MILLIGRAM(S): 20 TABLET, DELAYED RELEASE ORAL at 04:56

## 2021-02-13 NOTE — DISCHARGE NOTE PROVIDER - CARE PROVIDERS DIRECT ADDRESSES
[email or web address]

## 2021-02-13 NOTE — DISCHARGE NOTE PROVIDER - NSDCCPCAREPLAN_GEN_ALL_CORE_FT
PRINCIPAL DISCHARGE DIAGNOSIS  Diagnosis: Anemia  Assessment and Plan of Treatment: Follow up with gastroenterology in 1-2 weeks. call doctor if any dizziness, chest pain, shortness of breath, blood in stool, black stools.      SECONDARY DISCHARGE DIAGNOSES  Diagnosis: Ovarian cyst  Assessment and Plan of Treatment: Follow up with gynecology in 2 weeks. Repeat a pelvic ultrasound in 6 weeks. Call doctor if any abdominal pain or fevers.    Diagnosis: Pulmonary edema  Assessment and Plan of Treatment: resolved after treatment in hospital. Call doctor if any shortness of breath, leg swelling, chest pain. Follow up with your primary care doctor in 1 week.

## 2021-02-13 NOTE — DISCHARGE NOTE PROVIDER - NSDCMRMEDTOKEN_GEN_ALL_CORE_FT
Lasix 40 mg oral tablet: 1 tab(s) orally once a day   pantoprazole 40 mg oral delayed release tablet: 1 tab(s) orally once a day

## 2021-02-13 NOTE — DISCHARGE NOTE NURSING/CASE MANAGEMENT/SOCIAL WORK - PATIENT PORTAL LINK FT
You can access the FollowMyHealth Patient Portal offered by [facility] by registering at the following website: [email or web address]. By joining [employer]’s FollowMyHealth portal, you will also be able to view your health information using other applications (apps) compatible with our system.

## 2021-02-13 NOTE — DISCHARGE NOTE PROVIDER - HOSPITAL COURSE
Patient is 51yo old female with no pmhx, last seen by pmd 10 yrs ago presented to ER for sob for few hours. As per patient she has been suffering from mod b/l LE pain worse with standing or walking and better with rest, but her symptoms was improving. She went to pmd today for well visit. As she left pmd office, she got sob. So went back to office and pmd office called EMS. He sob subsided en-route to ER and resolved in ED. She denies any chest pain, palpitation abdominal pain no urinary or bowel issues. She lives home with , fully functional.   Pt admitted:  Acute hypoxemic respiratory failure----- resolved  due to Acute CHF exacerbation likely high output heart failure  b/l pleural effusion  congestive hepatomegaly  chronic symptomatic microcytic anemia  LE pain  hypokalemia    Plan  maintain oxygen sat  currently off NC  IV lasix 40 mg bid  BMP daily  K replacement; extra Kcl 40 meq ordered  Mg WNL  PPI  GI on board  Endoscopy a outpatient  LE doppler US -ve for DVT   DVT ppx SCD  ECho done WNL as per cardiac center (phone call)  US transvaginal to check tubo-ovarain pathology and fibroid?  SCD for DVT ppx  repeat CXR to reassess pleural effusions   Patient is 49yo old female with no pmhx, last seen by pmd 10 yrs ago presented to ER for sob for few hours. As per patient she has been suffering from mod b/l LE pain worse with standing or walking and better with rest, but her symptoms was improving. She went to pmd today for well visit. As she left pmd office, she got sob. So went back to office and pmd office called EMS. He sob subsided en-route to ER and resolved in ED. She denies any chest pain, palpitation abdominal pain no urinary or bowel issues. She lives home with , fully functional.   Pt admitted:  Acute hypoxemic respiratory failure----- resolved  due to Acute CHF exacerbation likely high output heart failure  b/l pleural effusion  congestive hepatomegaly  chronic symptomatic microcytic anemia  LE pain  hypokalemia    Plan  maintain oxygen sat  currently off NC  IV lasix 40 mg bid  BMP daily  K replacement; extra Kcl 40 meq ordered  Mg WNL  PPI  GI on board  Endoscopy a outpatient  LE doppler US -ve for DVT   DVT ppx SCD  ECho done WNL as per cardiac center (phone call)  US transvaginal to check tubo-ovarain pathology and fibroid?  SCD for DVT ppx  repeat CXR to reassess pleural effusions    The pt is hemodyanmcially stable, deneis sob,chest  pain,palitiaons  CTA b/l  CVS S1 S2 no MRG  CXR noted with decreasing effusion

## 2021-02-13 NOTE — DISCHARGE NOTE PROVIDER - CARE PROVIDER_API CALL
Fritz Greenfield  INTERNAL MEDICINE  [address] [place] [address]  Phone: [phone]  Fax: [phone]  Follow Up Time:     Ines Wasserman)  Gastroenterology  [address] [place] [address]  Phone: [phone]  Fax: [phone]  Follow Up Time:

## 2021-02-16 LAB
MANUAL DIF COMMENT BLD-IMP: SIGNIFICANT CHANGE UP

## 2021-02-22 NOTE — CDI QUERY NOTE - NSCDIOTHERTXTBX_GEN_ALL_CORE_HH
Documentation:  ** Attending progress notes (2/11) indicates a diagnosis of acute respiratory failure due to acute CHF exacerbation.  ** The record reflects a RR throughout stay (ED: RR 22. In house RR 16-20).  pulse ox on 3 liters of 100% (Per ambulance record and ED Flowsheet)    No ABGs on record    physical exam findings do not indicate any respiratory distress/labored breathing or other signs of distress noted:    - Ambulance record P3: Resp: 16. Effort: Normal. SPO2: 94% on RA    - 2/10 ED: Exam: RESP: positive diffuse ronchi.     - 2/10 H&P: CHEST/LUNG: Clear to auscultation bilaterally; No wheeze         - 2/11 PN DR. San:  CHEST/LUNG: Dec iar entry at lower zones b/l.    Based on the above, can you please further clarify patients respiratory status as:  [ ] Acute chf without acute respiratory failure  [ ] Acute chf with acute respiratory failure (please provide additional clinical evidence for acute respiratory failure)  [ ] Other (Please specify)  [ ] Unable to determine

## 2021-02-25 DIAGNOSIS — J96.01 ACUTE RESPIRATORY FAILURE WITH HYPOXIA: ICD-10-CM

## 2021-02-25 DIAGNOSIS — D25.9 LEIOMYOMA OF UTERUS, UNSPECIFIED: ICD-10-CM

## 2021-02-25 DIAGNOSIS — R06.02 SHORTNESS OF BREATH: ICD-10-CM

## 2021-02-25 DIAGNOSIS — J98.11 ATELECTASIS: ICD-10-CM

## 2021-02-25 DIAGNOSIS — I51.7 CARDIOMEGALY: ICD-10-CM

## 2021-02-25 DIAGNOSIS — D64.9 ANEMIA, UNSPECIFIED: ICD-10-CM

## 2021-02-25 DIAGNOSIS — J81.1 CHRONIC PULMONARY EDEMA: ICD-10-CM

## 2021-02-25 DIAGNOSIS — K76.1 CHRONIC PASSIVE CONGESTION OF LIVER: ICD-10-CM

## 2021-02-25 DIAGNOSIS — I50.9 HEART FAILURE, UNSPECIFIED: ICD-10-CM

## 2021-02-25 DIAGNOSIS — E87.6 HYPOKALEMIA: ICD-10-CM

## 2021-02-25 DIAGNOSIS — N83.202 UNSPECIFIED OVARIAN CYST, LEFT SIDE: ICD-10-CM
